# Patient Record
Sex: FEMALE | Employment: FULL TIME
[De-identification: names, ages, dates, MRNs, and addresses within clinical notes are randomized per-mention and may not be internally consistent; named-entity substitution may affect disease eponyms.]

---

## 2024-11-19 ENCOUNTER — HOSPITAL ENCOUNTER (OUTPATIENT)
Facility: HOSPITAL | Age: 29
Setting detail: RECURRING SERIES
Discharge: HOME | End: 2024-11-22
Payer: OTHER GOVERNMENT

## 2024-11-19 PROCEDURE — 97535 SELF CARE MNGMENT TRAINING: CPT

## 2024-11-19 PROCEDURE — 97161 PT EVAL LOW COMPLEX 20 MIN: CPT

## 2024-11-19 PROCEDURE — 97110 THERAPEUTIC EXERCISES: CPT

## 2024-11-19 NOTE — PROGRESS NOTES
PT DAILY TREATMENT NOTE/KNEE EVAL       Patient Name: Belia Vizcaino    Date: 2024    : 1995  Insurance: Payor:  EAST / Plan:  EAST PRIME / Product Type: *No Product type* /      Patient  verified yes     Visit #   Current / Total 1 24   Time   In / Out 9:58 10:33   Pain   In / Out 1 1   Subjective Functional Status/Changes: See below      Treatment Area: Left knee pain [M25.562]    SUBJECTIVE  Pain Level (0-10 scale): pain at worst: 4/10   []constant []intermittent []improving []worsening []no change since onset    Subjective functional status/changes:     PLOF: Patient reports limitation with activity due to chronic low back pain.   Limitations to PLOF: Patient exhibits decreased left knee/hip strength, decreased left LE flexibility, decreased form and mild discomfort with mini squats, decreased muscular endurance with sit to stand transfers, and decreased stability with SLS. Noted mild lateral patellar tracking on the left with quad sets. Patient demonstrates potential to make functional gains within a reasonable time frame. Patient would benefit from skilled PT to address above deficits and assist with return to PLOF.   Mechanism of Injury: Patient presents with left knee pain that began approximately 2022 with an insidious onset. Patient thinks it may be related to chronic LBP, but is unsure.     Current symptoms/Complaints: Patient describes left knee pain as intermittent stiffness with occasional locking and frequent non painful popping. Patient denies numbness/tingling. Patient reports experiencing left knee buckling approximately 1x/week, but denies a fall. Patient stated onset of buckling is random. Patient has increased difficulty with negotiating stairs.   Previous Treatment/Compliance: no x-ray per patient   PMHx/Surgical Hx: patient confirmed: Chronic LBP: since ; No previous back/hip/knee/ankle or foot surgeries. No pacemaker, no cardiac issues and

## 2024-11-19 NOTE — PROGRESS NOTES
[facility]  [address] [facility] [address] [place] [address] Ph:[phone] Fx: [phone]    PLAN OF CARE/ Statement of Necessity for Physical Therapy Services           Patient name: Belia Vizcaino Start of Care: 2024   Referral source: Shira Chavez MD : 1995    Medical Diagnosis: Left knee pain [M25.562]       Onset Date: 2022   Treatment Diagnosis: M25.562  LEFT KNEE PAIN                                      Prior Hospitalization: see medical history Provider#: [id number]   Medications: Verified on Patient Summary List     Comorbidities: Chronic LBP since , alcohol use, arthritis    Prior Level of Function: Patient reports limitation with activity due to chronic low back pain.     The Plan of Care and following information is based on the information from the initial evaluation.    Assessment / key information:  Patient presents with left knee pain that began approximately 2022 with an insidious onset. Patient thinks onset of left knee pain may be related to chronic LBP, but is unsure.  Patient describes left knee pain as intermittent stiffness with occasional locking and frequent non painful popping. Patient denies numbness/tingling. Patient reports experiencing left knee buckling approximately 1x/week, but denies a fall. Patient stated onset of buckling is random. Patient has increased difficulty with negotiating stairs. Patient exhibits decreased left knee/hip strength, decreased left LE flexibility, decreased form and mild discomfort with mini squats, decreased muscular endurance with sit to stand transfers, and decreased stability with SLS. Noted mild lateral patellar tracking on the left with quad sets. Patient demonstrates potential to make functional gains within a reasonable time frame. Patient would benefit from skilled PT to address above deficits and assist with return to PLOF.       Evaluation Complexity:

## 2024-11-21 ENCOUNTER — HOSPITAL ENCOUNTER (OUTPATIENT)
Facility: HOSPITAL | Age: 29
Setting detail: RECURRING SERIES
Discharge: HOME | End: 2024-11-24
Payer: OTHER GOVERNMENT

## 2024-11-21 PROCEDURE — 97110 THERAPEUTIC EXERCISES: CPT

## 2024-11-21 PROCEDURE — 97112 NEUROMUSCULAR REEDUCATION: CPT

## 2024-11-21 PROCEDURE — 97530 THERAPEUTIC ACTIVITIES: CPT

## 2024-11-21 NOTE — PROGRESS NOTES
PHYSICAL / OCCUPATIONAL THERAPY - DAILY TREATMENT NOTE     Patient Name: Belia Vizcaino    Date: 2024    : 1995  Insurance: Payor:  EAST / Plan:  EAST PRIME / Product Type: *No Product type* /      Patient  verified Yes     Visit #   Current / Total 2 24   Time   In / Out 2:31 3:08   Pain   In / Out 1/10 010   Subjective Functional Status/Changes: Pt reports no new complaints.   Changes to:  Allergies, Med Hx, Sx Hx?   no       TREATMENT AREA =  Left knee pain [M25.562]    If an interpreting service is utilized for treatment of this patient, the contents of this document represent the material reviewed with the patient via the .     OBJECTIVE      Therapeutic Procedures:  Tx Min Billable or 1:1 Min (if diff from Tx Min) Procedure, Rationale, Specifics   17  08525 Therapeutic Exercise (timed):  increase ROM, strength, coordination, balance, and proprioception to improve patient's ability to progress to PLOF and address remaining functional goals. (see flow sheet as applicable)    Details if applicable:       10  18247 Neuromuscular Re-Education (timed):  improve balance, coordination, kinesthetic sense, posture, core stability and proprioception to improve patient's ability to develop conscious control of individual muscles and awareness of position of extremities in order to progress to PLOF and address remaining functional goals. (see flow sheet as applicable)    Details if applicable:     10  47467 Therapeutic Activity (timed):  use of dynamic activities replicating functional movements to increase ROM, strength, coordination, balance, and proprioception in order to improve patient's ability to progress to PLOF and address remaining functional goals.  (see flow sheet as applicable)     Details if applicable:     37  [facility] Totals Reminder: bill using total billable min of TIMED therapeutic procedures (example: do not include dry needle or estim unattended, both untimed

## 2024-11-26 ENCOUNTER — HOSPITAL ENCOUNTER (OUTPATIENT)
Facility: HOSPITAL | Age: 29
Setting detail: RECURRING SERIES
Discharge: HOME | End: 2024-11-29
Payer: OTHER GOVERNMENT

## 2024-11-26 PROCEDURE — 97112 NEUROMUSCULAR REEDUCATION: CPT

## 2024-11-26 PROCEDURE — 97530 THERAPEUTIC ACTIVITIES: CPT

## 2024-11-26 PROCEDURE — 97110 THERAPEUTIC EXERCISES: CPT

## 2024-11-26 NOTE — PROGRESS NOTES
PHYSICAL / OCCUPATIONAL THERAPY - DAILY TREATMENT NOTE     Patient Name: Belia Vizcaino    Date: 2024    : 1995  Insurance: Payor:  EAST / Plan:  EAST PRIME / Product Type: *No Product type* /      Patient  verified Yes     Visit #   Current / Total 3 24   Time   In / Out 950 1049   Pain   In / Out 0 0   Subjective Functional Status/Changes: Pt reports she does not have pain coming in but she can tell she is lacking a lot of stability.   Changes to:  Allergies, Med Hx, Sx Hx?   no       TREATMENT AREA =  Left knee pain [M25.562]    If an interpreting service is utilized for treatment of this patient, the contents of this document represent the material reviewed with the patient via the .     OBJECTIVE    Therapeutic Procedures:  Tx Min Billable or 1:1 Min (if diff from Tx Min) Procedure, Rationale, Specifics   23  41631 Therapeutic Exercise (timed):  increase ROM, strength, coordination, balance, and proprioception to improve patient's ability to progress to PLOF and address remaining functional goals. (see flow sheet as applicable)    Details if applicable:       21  98544 Neuromuscular Re-Education (timed):  improve balance, coordination, kinesthetic sense, posture, core stability and proprioception to improve patient's ability to develop conscious control of individual muscles and awareness of position of extremities in order to progress to PLOF and address remaining functional goals. (see flow sheet as applicable)    Details if applicable:     15  91240 Therapeutic Activity (timed):  use of dynamic activities replicating functional movements to increase ROM, strength, coordination, balance, and proprioception in order to improve patient's ability to progress to PLOF and address remaining functional goals.  (see flow sheet as applicable)     Details if applicable:           Details if applicable:            Details if applicable:     59  [facility] Totals Reminder: bill

## 2024-12-02 ENCOUNTER — HOSPITAL ENCOUNTER (OUTPATIENT)
Facility: HOSPITAL | Age: 29
Setting detail: RECURRING SERIES
Discharge: HOME | End: 2024-12-05
Payer: OTHER GOVERNMENT

## 2024-12-02 PROCEDURE — 97112 NEUROMUSCULAR REEDUCATION: CPT

## 2024-12-02 PROCEDURE — 97530 THERAPEUTIC ACTIVITIES: CPT

## 2024-12-02 PROCEDURE — 97110 THERAPEUTIC EXERCISES: CPT

## 2024-12-02 NOTE — PROGRESS NOTES
codes, in totals to left)  8-22 min = 1 unit; 23-37 min = 2 units; 38-52 min = 3 units; 53-67 min = 4 units; 68-82 min = 5 units   Total Total     TOTAL TREATMENT TIME:        42     [x]  Patient Education billed concurrently with other procedures   [x] Review HEP    [] Progressed/Changed HEP, detail:    [] Other detail:       Objective Information/Functional Measures/Assessment    Patient tolerated treatment session well today. Progressed to dynamic step ups. Added SLS with ball toss to trampoline, to promote balance and joint stability. Also added single leg TRX mini squats and rev/lat lunges with slider. Patient continues to make steady progress toward goals and would benefit from continued skilled PT intervention to address remaining deficits outlined in goals below.      Patient will continue to benefit from skilled PT / OT services to modify and progress therapeutic interventions, analyze and address functional mobility deficits, analyze and address ROM deficits, analyze and address strength deficits, analyze and address soft tissue restrictions, analyze and cue for proper movement patterns, analyze and modify for postural abnormalities, and instruct in home and community integration to address functional deficits and attain remaining goals.    Progress toward goals / Updated goals:  []  See Progress Note/Recertification    Short Term Goals: To be accomplished in 12 treatments  Patient will be independent with HEP to increase ease with ADLs.               Eval: HEP established               Current: Pt has not been able to attempt. Plans to start this weekend. 11/21/2024.  2. Patient will report no buckling in left knee for 2 weeks to increase ease with work related tasks.               Eval: Patient reports experiencing left knee buckling approximately 1x/week   Current: Pt reports \"I haven't noticed it much.\" 12/2/2024.  3. Patient will be able to maintain SLS On floor with EC for 20seconds without LOB to

## 2024-12-05 ENCOUNTER — HOSPITAL ENCOUNTER (OUTPATIENT)
Facility: HOSPITAL | Age: 29
Setting detail: RECURRING SERIES
Discharge: HOME | End: 2024-12-08
Payer: OTHER GOVERNMENT

## 2024-12-05 PROCEDURE — 97112 NEUROMUSCULAR REEDUCATION: CPT

## 2024-12-05 PROCEDURE — 97110 THERAPEUTIC EXERCISES: CPT

## 2024-12-05 PROCEDURE — 97530 THERAPEUTIC ACTIVITIES: CPT

## 2024-12-05 NOTE — PROGRESS NOTES
Pt reports \"I haven't noticed it much.\" 12/2/2024.  3. Patient will be able to maintain SLS On floor with EC for 20seconds without LOB to increase ease walking on uneven surfaces.               Eval: SLS on floor with EC: 6sec then required HHA                Current: SLS on floor with EC: 12 seconds. 12/2/2024.  Long Term Goals: To be accomplished in 24 treatments  Patient will improve LEFS to at least 72/80 to demonstate improved function.   Eval: LEFS: 67/80  2. Patient will be able to perform 13 sit to stand transfers in 30 seconds without pain to increase ease with car transfers.   Eval: 11 STS in 30 seconds, no pain but reported feeling like \"knee wants to give out\"   Current: 12 STS in 30 seconds, no pain but did report \"weakness\" 12/2/2024  3. Patient will be able to perform 5 squats with good form without pain to increase ease with household tasks.               Eval: Weight shift to the right noted with mini squats.    Current: Right weight shift with squats. 12/5/2024.    4. Patient will be able to negotiate 12 stairs with a reciprocal gait without pain reported to increase ease with community Adls.               Eval: Patient reports 2/10 pain level with stair negotiation    PLAN  Yes  Continue plan of care  []  Upgrade activities as tolerated  []  Discharge due to :  []  Other:    Don Kate PTA    12/5/2024    11:47 AM    Future Appointments   Date Time Provider Department Center   12/5/2024 11:50 AM Don Kate PTA [facility]   12/9/2024  9:10 AM Kennedy Bourgeois PTA [facility]   12/13/2024  9:50 AM Janet Figueroa PT [facility]   1/3/2025  9:10 AM Kennedy Bourgeois PTA [facility]

## 2024-12-09 ENCOUNTER — HOSPITAL ENCOUNTER (OUTPATIENT)
Facility: HOSPITAL | Age: 29
Setting detail: RECURRING SERIES
Discharge: HOME | End: 2024-12-12
Payer: OTHER GOVERNMENT

## 2024-12-09 PROCEDURE — 97530 THERAPEUTIC ACTIVITIES: CPT

## 2024-12-09 PROCEDURE — 97112 NEUROMUSCULAR REEDUCATION: CPT

## 2024-12-09 PROCEDURE — 97110 THERAPEUTIC EXERCISES: CPT

## 2024-12-09 NOTE — PROGRESS NOTES
PHYSICAL / OCCUPATIONAL THERAPY - DAILY TREATMENT NOTE     Patient Name: Belia Vizcaino    Date: 2024    : 1995  Insurance: Payor:  EAST / Plan:  EAST PRIME / Product Type: *No Product type* /      Patient  verified Yes     Visit #   Current / Total 6 24   Time   In / Out 9:10 9:54   Pain   In / Out /10 0/10   Subjective Functional Status/Changes: Pt reports knee symptoms have improved since SOC.   Changes to:  Allergies, Med Hx, Sx Hx?   no       TREATMENT AREA =  Left knee pain [M25.562]    If an interpreting service is utilized for treatment of this patient, the contents of this document represent the material reviewed with the patient via the .     OBJECTIVE    Therapeutic Procedures:  Tx Min Billable or 1:1 Min (if diff from Tx Min) Procedure, Rationale, Specifics   26  50734 Therapeutic Exercise (timed):  increase ROM, strength, coordination, balance, and proprioception to improve patient's ability to progress to PLOF and address remaining functional goals. (see flow sheet as applicable)    Details if applicable:       10  57510 Neuromuscular Re-Education (timed):  improve balance, coordination, kinesthetic sense, posture, core stability and proprioception to improve patient's ability to develop conscious control of individual muscles and awareness of position of extremities in order to progress to PLOF and address remaining functional goals. (see flow sheet as applicable)    Details if applicable:     8  95258 Therapeutic Activity (timed):  use of dynamic activities replicating functional movements to increase ROM, strength, coordination, balance, and proprioception in order to improve patient's ability to progress to PLOF and address remaining functional goals.  (see flow sheet as applicable)     Details if applicable:     44  [facility] Totals Reminder: bill using total billable min of TIMED therapeutic procedures (example: do not include dry needle or estim

## 2024-12-13 ENCOUNTER — HOSPITAL ENCOUNTER (OUTPATIENT)
Facility: HOSPITAL | Age: 29
Setting detail: RECURRING SERIES
Discharge: HOME | End: 2024-12-16
Payer: OTHER GOVERNMENT

## 2024-12-13 PROCEDURE — 97112 NEUROMUSCULAR REEDUCATION: CPT

## 2024-12-13 PROCEDURE — 97530 THERAPEUTIC ACTIVITIES: CPT

## 2024-12-13 PROCEDURE — 97110 THERAPEUTIC EXERCISES: CPT

## 2024-12-13 NOTE — PROGRESS NOTES
[facility]  [address] [place] [address] - Ph: [phone]   Fx: [phone]    PHYSICAL THERAPY PROGRESS NOTE      Patient name: Belia Vizcaino Start of Care: 24   Referral source: Shira Chavez MD : 1995    Medical Diagnosis: Left knee pain [M25.562]  Payor:  EAST / Plan:  EAST PRIME / Product Type: *No Product type* /  Onset Date:22    Treatment Diagnosis: M25.562  LEFT KNEE PAIN                                      Prior Hospitalization: see medical history Provider#: [id number]   Comorbidities: Chronic LBP since , alcohol use, arthritis     Prior Level of Function: Patient reports limitation with activity due to chronic low back pain.       Reporting Period: 24-24  Visits from Start of Care: 7    Missed Visits: 0    Goals/Measure of Progress: To be achieved in 12 weeks:    Short Term Goals: To be accomplished in 12 treatments  Patient will be independent with HEP to increase ease with ADLs.               Eval: HEP established               PN: Pt has not been able to attempt. Plans to start this weekend. 2024.  2. Patient will report no buckling in left knee for 2 weeks to increase ease with work related tasks.               Eval: Patient reports experiencing left knee buckling approximately 1x/week                PN: Pt reports \"I haven't noticed it much.\" 2024.  3. Patient will be able to maintain SLS On floor with EC for 20seconds without LOB to increase ease walking on uneven surfaces.               Eval: SLS on floor with EC: 6sec then required HHA                PN: SLS on floor with EC: 12 seconds. 2024.  Long Term Goals: To be accomplished in 24 treatments  Patient will improve LEFS to at least 72/80 to demonstate improved function.   Eval: LEFS: 67/80  7 PN: 4/80 goal met 24  2. Patient will be able to perform 13 sit to stand transfers in 30 seconds

## 2024-12-13 NOTE — PROGRESS NOTES
PHYSICAL / OCCUPATIONAL THERAPY - DAILY TREATMENT NOTE (updated )    Patient Name: Belia Vizcaino    Date: 2024    : 1995  Insurance: Payor: [employer] EAST / Plan:  EAST PRIME / Product Type: *No Product type* /      Patient  verified Yes     Visit #   Current / Total 7 24   Time   In / Out 950 1032   Pain   In / Out 3 2   Subjective Functional Status/Changes: It's little sore today.   Changes to:  Meds, Allergies, Med Hx, Sx Hx?  If yes, update Summary List no       TREATMENT AREA =  Left knee pain [M25.562]    OBJECTIVE  Therapeutic Procedures:  Tx Min Billable or 1:1 Min (if diff from Tx Min) Procedure, Rationale, Specifics   26  31009 Therapeutic Exercise (timed):  increase ROM, strength, coordination, balance, and proprioception to improve patient's ability to progress to PLOF and address remaining functional goals. (see flow sheet as applicable)     Details if applicable:       8  94131 Neuromuscular Re-Education (timed):  improve balance, coordination, kinesthetic sense, posture, core stability and proprioception to improve patient's ability to develop conscious control of individual muscles and awareness of position of extremities in order to progress to PLOF and address remaining functional goals. (see flow sheet as applicable)     Details if applicable:     8  76118 Therapeutic Activity (timed):  use of dynamic activities replicating functional movements to increase ROM, strength, coordination, balance, and proprioception in order to improve patient's ability to progress to PLOF and address remaining functional goals.  (see flow sheet as applicable)     Details if applicable:            Details if applicable:            Details if applicable:     42  [facility] Totals Reminder: bill using total billable min of TIMED therapeutic procedures (example: do not include dry needle or estim unattended, both untimed codes, in totals to left)  8-22 min = 1 unit; 23-37 min = 2 units; 38-52 min

## 2025-01-03 ENCOUNTER — HOSPITAL ENCOUNTER (OUTPATIENT)
Facility: HOSPITAL | Age: 30
Setting detail: RECURRING SERIES
Discharge: HOME | End: 2025-01-06
Payer: OTHER GOVERNMENT

## 2025-01-03 PROCEDURE — 97110 THERAPEUTIC EXERCISES: CPT

## 2025-01-03 PROCEDURE — 97530 THERAPEUTIC ACTIVITIES: CPT

## 2025-01-03 PROCEDURE — 97112 NEUROMUSCULAR REEDUCATION: CPT

## 2025-01-03 NOTE — PROGRESS NOTES
PHYSICAL / OCCUPATIONAL THERAPY - DAILY TREATMENT NOTE     Patient Name: Belia Vizcaino    Date: 1/3/2025    : 1995  Insurance: Payor: [employer] EAST / Plan:  EAST PRIME / Product Type: *No Product type* /      Patient  verified Yes     Visit #   Current / Total 8 24   Time   In / Out 9:12 9:51   Pain   In / Out 1/10 0/10   Subjective Functional Status/Changes: Reports intermittent bad days since previous treatment on 2024.   Changes to:  Allergies, Med Hx, Sx Hx?   no       TREATMENT AREA =  Left knee pain [M25.562]    If an interpreting service is utilized for treatment of this patient, the contents of this document represent the material reviewed with the patient via the .     OBJECTIVE    Therapeutic Procedures:  Tx Min Billable or 1:1 Min (if diff from Tx Min) Procedure, Rationale, Specifics   19  69924 Therapeutic Exercise (timed):  increase ROM, strength, coordination, balance, and proprioception to improve patient's ability to progress to PLOF and address remaining functional goals. (see flow sheet as applicable)    Details if applicable:       12  94778 Neuromuscular Re-Education (timed):  improve balance, coordination, kinesthetic sense, posture, core stability and proprioception to improve patient's ability to develop conscious control of individual muscles and awareness of position of extremities in order to progress to PLOF and address remaining functional goals. (see flow sheet as applicable)    Details if applicable:     8  83791 Therapeutic Activity (timed):  use of dynamic activities replicating functional movements to increase ROM, strength, coordination, balance, and proprioception in order to improve patient's ability to progress to PLOF and address remaining functional goals.  (see flow sheet as applicable)     Details if applicable:     39  [facility] Totals Reminder: bill using total billable min of TIMED therapeutic procedures (example: do not include dry needle

## 2025-01-15 ENCOUNTER — HOSPITAL ENCOUNTER (OUTPATIENT)
Facility: HOSPITAL | Age: 30
Setting detail: RECURRING SERIES
Discharge: HOME | End: 2025-01-18
Payer: OTHER GOVERNMENT

## 2025-01-15 PROCEDURE — 97112 NEUROMUSCULAR REEDUCATION: CPT

## 2025-01-15 PROCEDURE — 97530 THERAPEUTIC ACTIVITIES: CPT

## 2025-01-15 PROCEDURE — 97110 THERAPEUTIC EXERCISES: CPT

## 2025-01-15 NOTE — PROGRESS NOTES
[facility]  [address] [facility] [address] [place] [address] - Ph: [phone]   Fx: [phone]    PHYSICAL THERAPY PROGRESS NOTE      Patient name: Belia Vizcaino Start of Care: 24   Referral source: Shira Chavez MD : 1995    Medical Diagnosis: Left knee pain [M25.562]  Payor:  EAST / Plan:  EAST PRIME / Product Type: *No Product type* /  Onset Date:22    Treatment Diagnosis: M25.562  LEFT KNEE PAIN     Prior Hospitalization: see medical history Provider#: [id number]   Comorbidities: Chronic LBP since , alcohol use, arthritis   Prior Level of Function:Patient reports limitation with activity due to chronic low back pain.     Reporting Period: 2024-1/15/2024  Visits from Start of Care: 9    Missed Visits: 0    Goals/Measure of Progress: To be achieved in 12 weeks:    Short Term Goals: To be accomplished in 12 treatments  Patient will be independent with HEP to increase ease with ADLs.               Eval: HEP established               PN 1/15/2025: 2x a week.  2. Patient will report no buckling in left knee for 2 weeks to increase ease with work related tasks.               Eval: Patient reports experiencing left knee buckling approximately 1x/week              PN 1/15/2025: Denies feeling Left knee buckling in the last few weeks.  3. Patient will be able to maintain SLS On floor with EC for 20seconds without LOB to increase ease walking on uneven surfaces.               Eval: SLS on floor with EC: 6sec then required HHA               PN 1/3/2025: Met, 22 seconds.     Long Term Goals: To be accomplished in 24 treatments  Patient will improve LEFS to at least 72/80 to demonstate improved function.   Eval: LEFS: 67/80  PN 2024: 74/80 goal met  2. Patient will be able to perform 13 sit to stand transfers in 30 seconds without pain to increase ease with car transfers.   Eval: 11 STS in 30 seconds, no pain but 
PTA [facility]   1/24/2025 11:10 AM Janet Figueroa, PT [facility]   1/28/2025 12:30 PM Kennedy Bourgeois, PTA [facility]   1/31/2025 11:50 AM Kennedy Bourgeois, PTA [facility]   2/4/2025  1:10 PM Janet Figueroa, PT [facility]   2/7/2025  1:10 PM Kennedy Bourgeois, PTA [facility]   2/11/2025  1:10 PM Janet Figueroa, PT [facility]